# Patient Record
Sex: MALE
[De-identification: names, ages, dates, MRNs, and addresses within clinical notes are randomized per-mention and may not be internally consistent; named-entity substitution may affect disease eponyms.]

---

## 2021-10-28 ENCOUNTER — NURSE TRIAGE (OUTPATIENT)
Dept: OTHER | Facility: CLINIC | Age: 21
End: 2021-10-28

## 2021-10-28 NOTE — TELEPHONE ENCOUNTER
Brief description of call: patient's wife, Bobbi, calling with concern for the patient's Sore throat. She states that  went to walk in clinic in [place] 3 days ago for sore throat x3 days. Told he had strep throat and given amoxicillin BID. Told to call if he had not improved after 48 hours, abx on board x3 days. Bobbi states patient not improving and now having pain in sinuses. Triage deferred d/t duplicate call - patient had been advised to follow up as stated above. Advised patient to follow up with PCP - Bobbi states that patient does not have PCP at this time. Provided Bobbi with JT options for C/Virtual visit info for follow up. Also informed Bobbi to take patient to ED for urgent symptoms. Verbalized understanding. Care advice provided, patient verbalizes understanding; denies any other questions or concerns; instructed to call back for any new or worsening symptoms. This call is a result of a [facility]. Please do not respond to the triage nurse through this encounter. Any subsequent communication should be directly with the patient. Reason for Disposition  Malika Donaldson Caller has already spoken with another triager or PCP AND has further questions AND triager able to answer questions.     Protocols used: NO CONTACT OR DUPLICATE CONTACT CALL-ADULT-